# Patient Record
Sex: FEMALE | Race: BLACK OR AFRICAN AMERICAN | NOT HISPANIC OR LATINO | URBAN - METROPOLITAN AREA
[De-identification: names, ages, dates, MRNs, and addresses within clinical notes are randomized per-mention and may not be internally consistent; named-entity substitution may affect disease eponyms.]

---

## 2024-01-05 ENCOUNTER — EMERGENCY (EMERGENCY)
Facility: HOSPITAL | Age: 64
LOS: 1 days | Discharge: HOME | End: 2024-01-05
Attending: STUDENT IN AN ORGANIZED HEALTH CARE EDUCATION/TRAINING PROGRAM | Admitting: EMERGENCY MEDICINE
Payer: MEDICARE

## 2024-01-05 VITALS
DIASTOLIC BLOOD PRESSURE: 83 MMHG | SYSTOLIC BLOOD PRESSURE: 141 MMHG | OXYGEN SATURATION: 99 % | HEART RATE: 84 BPM | RESPIRATION RATE: 16 BRPM | TEMPERATURE: 99 F | HEIGHT: 66 IN | WEIGHT: 132.94 LBS

## 2024-01-05 VITALS
HEART RATE: 62 BPM | DIASTOLIC BLOOD PRESSURE: 72 MMHG | RESPIRATION RATE: 16 BRPM | SYSTOLIC BLOOD PRESSURE: 120 MMHG | TEMPERATURE: 98 F | OXYGEN SATURATION: 100 %

## 2024-01-05 LAB
A1C WITH ESTIMATED AVERAGE GLUCOSE RESULT: 5.4 % — SIGNIFICANT CHANGE UP (ref 4–5.6)
A1C WITH ESTIMATED AVERAGE GLUCOSE RESULT: 5.4 % — SIGNIFICANT CHANGE UP (ref 4–5.6)
ALBUMIN SERPL ELPH-MCNC: 4.2 G/DL — SIGNIFICANT CHANGE UP (ref 3.3–5)
ALBUMIN SERPL ELPH-MCNC: 4.2 G/DL — SIGNIFICANT CHANGE UP (ref 3.3–5)
ALP SERPL-CCNC: 64 U/L — SIGNIFICANT CHANGE UP (ref 40–120)
ALP SERPL-CCNC: 64 U/L — SIGNIFICANT CHANGE UP (ref 40–120)
ALT FLD-CCNC: 39 U/L — HIGH (ref 4–33)
ALT FLD-CCNC: 39 U/L — HIGH (ref 4–33)
AMPHET UR-MCNC: NEGATIVE — SIGNIFICANT CHANGE UP
AMPHET UR-MCNC: NEGATIVE — SIGNIFICANT CHANGE UP
ANION GAP SERPL CALC-SCNC: 12 MMOL/L — SIGNIFICANT CHANGE UP (ref 7–14)
ANION GAP SERPL CALC-SCNC: 12 MMOL/L — SIGNIFICANT CHANGE UP (ref 7–14)
APPEARANCE UR: CLEAR — SIGNIFICANT CHANGE UP
APPEARANCE UR: CLEAR — SIGNIFICANT CHANGE UP
APTT BLD: 32.6 SEC — SIGNIFICANT CHANGE UP (ref 24.5–35.6)
APTT BLD: 32.6 SEC — SIGNIFICANT CHANGE UP (ref 24.5–35.6)
AST SERPL-CCNC: 45 U/L — HIGH (ref 4–32)
AST SERPL-CCNC: 45 U/L — HIGH (ref 4–32)
BARBITURATES UR SCN-MCNC: NEGATIVE — SIGNIFICANT CHANGE UP
BARBITURATES UR SCN-MCNC: NEGATIVE — SIGNIFICANT CHANGE UP
BASOPHILS # BLD AUTO: 0.03 K/UL — SIGNIFICANT CHANGE UP (ref 0–0.2)
BASOPHILS # BLD AUTO: 0.03 K/UL — SIGNIFICANT CHANGE UP (ref 0–0.2)
BASOPHILS NFR BLD AUTO: 0.5 % — SIGNIFICANT CHANGE UP (ref 0–2)
BASOPHILS NFR BLD AUTO: 0.5 % — SIGNIFICANT CHANGE UP (ref 0–2)
BENZODIAZ UR-MCNC: NEGATIVE — SIGNIFICANT CHANGE UP
BENZODIAZ UR-MCNC: NEGATIVE — SIGNIFICANT CHANGE UP
BILIRUB SERPL-MCNC: 0.2 MG/DL — SIGNIFICANT CHANGE UP (ref 0.2–1.2)
BILIRUB SERPL-MCNC: 0.2 MG/DL — SIGNIFICANT CHANGE UP (ref 0.2–1.2)
BILIRUB UR-MCNC: NEGATIVE — SIGNIFICANT CHANGE UP
BILIRUB UR-MCNC: NEGATIVE — SIGNIFICANT CHANGE UP
BUN SERPL-MCNC: 18 MG/DL — SIGNIFICANT CHANGE UP (ref 7–23)
BUN SERPL-MCNC: 18 MG/DL — SIGNIFICANT CHANGE UP (ref 7–23)
CALCIUM SERPL-MCNC: 9.8 MG/DL — SIGNIFICANT CHANGE UP (ref 8.4–10.5)
CALCIUM SERPL-MCNC: 9.8 MG/DL — SIGNIFICANT CHANGE UP (ref 8.4–10.5)
CHLORIDE SERPL-SCNC: 102 MMOL/L — SIGNIFICANT CHANGE UP (ref 98–107)
CHLORIDE SERPL-SCNC: 102 MMOL/L — SIGNIFICANT CHANGE UP (ref 98–107)
CK MB BLD-MCNC: 1.2 % — SIGNIFICANT CHANGE UP (ref 0–2.5)
CK MB BLD-MCNC: 1.2 % — SIGNIFICANT CHANGE UP (ref 0–2.5)
CK MB CFR SERPL CALC: 2.1 NG/ML — SIGNIFICANT CHANGE UP
CK MB CFR SERPL CALC: 2.1 NG/ML — SIGNIFICANT CHANGE UP
CK SERPL-CCNC: 179 U/L — HIGH (ref 25–170)
CK SERPL-CCNC: 179 U/L — HIGH (ref 25–170)
CO2 SERPL-SCNC: 27 MMOL/L — SIGNIFICANT CHANGE UP (ref 22–31)
CO2 SERPL-SCNC: 27 MMOL/L — SIGNIFICANT CHANGE UP (ref 22–31)
COCAINE METAB.OTHER UR-MCNC: NEGATIVE — SIGNIFICANT CHANGE UP
COCAINE METAB.OTHER UR-MCNC: NEGATIVE — SIGNIFICANT CHANGE UP
COLOR SPEC: YELLOW — SIGNIFICANT CHANGE UP
COLOR SPEC: YELLOW — SIGNIFICANT CHANGE UP
CREAT SERPL-MCNC: 0.57 MG/DL — SIGNIFICANT CHANGE UP (ref 0.5–1.3)
CREAT SERPL-MCNC: 0.57 MG/DL — SIGNIFICANT CHANGE UP (ref 0.5–1.3)
CREATININE URINE RESULT, DAU: 18 MG/DL — SIGNIFICANT CHANGE UP
CREATININE URINE RESULT, DAU: 18 MG/DL — SIGNIFICANT CHANGE UP
DIFF PNL FLD: NEGATIVE — SIGNIFICANT CHANGE UP
DIFF PNL FLD: NEGATIVE — SIGNIFICANT CHANGE UP
EGFR: 101 ML/MIN/1.73M2 — SIGNIFICANT CHANGE UP
EGFR: 101 ML/MIN/1.73M2 — SIGNIFICANT CHANGE UP
EOSINOPHIL # BLD AUTO: 0.09 K/UL — SIGNIFICANT CHANGE UP (ref 0–0.5)
EOSINOPHIL # BLD AUTO: 0.09 K/UL — SIGNIFICANT CHANGE UP (ref 0–0.5)
EOSINOPHIL NFR BLD AUTO: 1.4 % — SIGNIFICANT CHANGE UP (ref 0–6)
EOSINOPHIL NFR BLD AUTO: 1.4 % — SIGNIFICANT CHANGE UP (ref 0–6)
ESTIMATED AVERAGE GLUCOSE: 108 — SIGNIFICANT CHANGE UP
ESTIMATED AVERAGE GLUCOSE: 108 — SIGNIFICANT CHANGE UP
FOLATE SERPL-MCNC: 5.8 NG/ML — SIGNIFICANT CHANGE UP (ref 3.1–17.5)
FOLATE SERPL-MCNC: 5.8 NG/ML — SIGNIFICANT CHANGE UP (ref 3.1–17.5)
GLUCOSE SERPL-MCNC: 121 MG/DL — HIGH (ref 70–99)
GLUCOSE SERPL-MCNC: 121 MG/DL — HIGH (ref 70–99)
GLUCOSE UR QL: NEGATIVE MG/DL — SIGNIFICANT CHANGE UP
GLUCOSE UR QL: NEGATIVE MG/DL — SIGNIFICANT CHANGE UP
HCT VFR BLD CALC: 37.2 % — SIGNIFICANT CHANGE UP (ref 34.5–45)
HCT VFR BLD CALC: 37.2 % — SIGNIFICANT CHANGE UP (ref 34.5–45)
HGB BLD-MCNC: 12.4 G/DL — SIGNIFICANT CHANGE UP (ref 11.5–15.5)
HGB BLD-MCNC: 12.4 G/DL — SIGNIFICANT CHANGE UP (ref 11.5–15.5)
IANC: 1.6 K/UL — LOW (ref 1.8–7.4)
IANC: 1.6 K/UL — LOW (ref 1.8–7.4)
IMM GRANULOCYTES NFR BLD AUTO: 0.2 % — SIGNIFICANT CHANGE UP (ref 0–0.9)
IMM GRANULOCYTES NFR BLD AUTO: 0.2 % — SIGNIFICANT CHANGE UP (ref 0–0.9)
INR BLD: 1.04 RATIO — SIGNIFICANT CHANGE UP (ref 0.85–1.18)
INR BLD: 1.04 RATIO — SIGNIFICANT CHANGE UP (ref 0.85–1.18)
KETONES UR-MCNC: NEGATIVE MG/DL — SIGNIFICANT CHANGE UP
KETONES UR-MCNC: NEGATIVE MG/DL — SIGNIFICANT CHANGE UP
LACTATE SERPL-SCNC: 1.2 MMOL/L — SIGNIFICANT CHANGE UP (ref 0.5–2)
LACTATE SERPL-SCNC: 1.2 MMOL/L — SIGNIFICANT CHANGE UP (ref 0.5–2)
LEUKOCYTE ESTERASE UR-ACNC: NEGATIVE — SIGNIFICANT CHANGE UP
LEUKOCYTE ESTERASE UR-ACNC: NEGATIVE — SIGNIFICANT CHANGE UP
LYMPHOCYTES # BLD AUTO: 4.27 K/UL — HIGH (ref 1–3.3)
LYMPHOCYTES # BLD AUTO: 4.27 K/UL — HIGH (ref 1–3.3)
LYMPHOCYTES # BLD AUTO: 65.1 % — HIGH (ref 13–44)
LYMPHOCYTES # BLD AUTO: 65.1 % — HIGH (ref 13–44)
MAGNESIUM SERPL-MCNC: 1.9 MG/DL — SIGNIFICANT CHANGE UP (ref 1.6–2.6)
MCHC RBC-ENTMCNC: 30 PG — SIGNIFICANT CHANGE UP (ref 27–34)
MCHC RBC-ENTMCNC: 30 PG — SIGNIFICANT CHANGE UP (ref 27–34)
MCHC RBC-ENTMCNC: 33.3 GM/DL — SIGNIFICANT CHANGE UP (ref 32–36)
MCHC RBC-ENTMCNC: 33.3 GM/DL — SIGNIFICANT CHANGE UP (ref 32–36)
MCV RBC AUTO: 89.9 FL — SIGNIFICANT CHANGE UP (ref 80–100)
MCV RBC AUTO: 89.9 FL — SIGNIFICANT CHANGE UP (ref 80–100)
METHADONE UR-MCNC: NEGATIVE — SIGNIFICANT CHANGE UP
METHADONE UR-MCNC: NEGATIVE — SIGNIFICANT CHANGE UP
MONOCYTES # BLD AUTO: 0.56 K/UL — SIGNIFICANT CHANGE UP (ref 0–0.9)
MONOCYTES # BLD AUTO: 0.56 K/UL — SIGNIFICANT CHANGE UP (ref 0–0.9)
MONOCYTES NFR BLD AUTO: 8.5 % — SIGNIFICANT CHANGE UP (ref 2–14)
MONOCYTES NFR BLD AUTO: 8.5 % — SIGNIFICANT CHANGE UP (ref 2–14)
NEUTROPHILS # BLD AUTO: 1.6 K/UL — LOW (ref 1.8–7.4)
NEUTROPHILS # BLD AUTO: 1.6 K/UL — LOW (ref 1.8–7.4)
NEUTROPHILS NFR BLD AUTO: 24.3 % — LOW (ref 43–77)
NEUTROPHILS NFR BLD AUTO: 24.3 % — LOW (ref 43–77)
NITRITE UR-MCNC: NEGATIVE — SIGNIFICANT CHANGE UP
NITRITE UR-MCNC: NEGATIVE — SIGNIFICANT CHANGE UP
NRBC # BLD: 0 /100 WBCS — SIGNIFICANT CHANGE UP (ref 0–0)
NRBC # BLD: 0 /100 WBCS — SIGNIFICANT CHANGE UP (ref 0–0)
NRBC # FLD: 0 K/UL — SIGNIFICANT CHANGE UP (ref 0–0)
NRBC # FLD: 0 K/UL — SIGNIFICANT CHANGE UP (ref 0–0)
OPIATES UR-MCNC: NEGATIVE — SIGNIFICANT CHANGE UP
OPIATES UR-MCNC: NEGATIVE — SIGNIFICANT CHANGE UP
OXYCODONE UR-MCNC: NEGATIVE — SIGNIFICANT CHANGE UP
OXYCODONE UR-MCNC: NEGATIVE — SIGNIFICANT CHANGE UP
PCP SPEC-MCNC: SIGNIFICANT CHANGE UP
PCP SPEC-MCNC: SIGNIFICANT CHANGE UP
PCP UR-MCNC: NEGATIVE — SIGNIFICANT CHANGE UP
PCP UR-MCNC: NEGATIVE — SIGNIFICANT CHANGE UP
PH UR: 7.5 — SIGNIFICANT CHANGE UP (ref 5–8)
PH UR: 7.5 — SIGNIFICANT CHANGE UP (ref 5–8)
PHOSPHATE SERPL-MCNC: 2.9 MG/DL — SIGNIFICANT CHANGE UP (ref 2.5–4.5)
PHOSPHATE SERPL-MCNC: 2.9 MG/DL — SIGNIFICANT CHANGE UP (ref 2.5–4.5)
PHOSPHATE SERPL-MCNC: 3.2 MG/DL — SIGNIFICANT CHANGE UP (ref 2.5–4.5)
PHOSPHATE SERPL-MCNC: 3.2 MG/DL — SIGNIFICANT CHANGE UP (ref 2.5–4.5)
PLATELET # BLD AUTO: 192 K/UL — SIGNIFICANT CHANGE UP (ref 150–400)
PLATELET # BLD AUTO: 192 K/UL — SIGNIFICANT CHANGE UP (ref 150–400)
POTASSIUM SERPL-MCNC: 3.2 MMOL/L — LOW (ref 3.5–5.3)
POTASSIUM SERPL-MCNC: 3.2 MMOL/L — LOW (ref 3.5–5.3)
POTASSIUM SERPL-SCNC: 3.2 MMOL/L — LOW (ref 3.5–5.3)
POTASSIUM SERPL-SCNC: 3.2 MMOL/L — LOW (ref 3.5–5.3)
PROT SERPL-MCNC: 7.9 G/DL — SIGNIFICANT CHANGE UP (ref 6–8.3)
PROT SERPL-MCNC: 7.9 G/DL — SIGNIFICANT CHANGE UP (ref 6–8.3)
PROT UR-MCNC: NEGATIVE MG/DL — SIGNIFICANT CHANGE UP
PROT UR-MCNC: NEGATIVE MG/DL — SIGNIFICANT CHANGE UP
PROTHROM AB SERPL-ACNC: 11.6 SEC — SIGNIFICANT CHANGE UP (ref 9.5–13)
PROTHROM AB SERPL-ACNC: 11.6 SEC — SIGNIFICANT CHANGE UP (ref 9.5–13)
RBC # BLD: 4.14 M/UL — SIGNIFICANT CHANGE UP (ref 3.8–5.2)
RBC # BLD: 4.14 M/UL — SIGNIFICANT CHANGE UP (ref 3.8–5.2)
RBC # FLD: 11.9 % — SIGNIFICANT CHANGE UP (ref 10.3–14.5)
RBC # FLD: 11.9 % — SIGNIFICANT CHANGE UP (ref 10.3–14.5)
SODIUM SERPL-SCNC: 141 MMOL/L — SIGNIFICANT CHANGE UP (ref 135–145)
SODIUM SERPL-SCNC: 141 MMOL/L — SIGNIFICANT CHANGE UP (ref 135–145)
SP GR SPEC: 1.03 — SIGNIFICANT CHANGE UP (ref 1–1.03)
SP GR SPEC: 1.03 — SIGNIFICANT CHANGE UP (ref 1–1.03)
T4 AB SER-ACNC: 6.4 UG/DL — SIGNIFICANT CHANGE UP (ref 5.1–13)
T4 AB SER-ACNC: 6.4 UG/DL — SIGNIFICANT CHANGE UP (ref 5.1–13)
THC UR QL: NEGATIVE — SIGNIFICANT CHANGE UP
THC UR QL: NEGATIVE — SIGNIFICANT CHANGE UP
TROPONIN T, HIGH SENSITIVITY RESULT: 8 NG/L — SIGNIFICANT CHANGE UP
TROPONIN T, HIGH SENSITIVITY RESULT: 8 NG/L — SIGNIFICANT CHANGE UP
TSH SERPL-MCNC: 6.53 UIU/ML — HIGH (ref 0.27–4.2)
TSH SERPL-MCNC: 6.53 UIU/ML — HIGH (ref 0.27–4.2)
UROBILINOGEN FLD QL: 0.2 MG/DL — SIGNIFICANT CHANGE UP (ref 0.2–1)
UROBILINOGEN FLD QL: 0.2 MG/DL — SIGNIFICANT CHANGE UP (ref 0.2–1)
VIT B12 SERPL-MCNC: 1798 PG/ML — HIGH (ref 200–900)
VIT B12 SERPL-MCNC: 1798 PG/ML — HIGH (ref 200–900)
WBC # BLD: 6.56 K/UL — SIGNIFICANT CHANGE UP (ref 3.8–10.5)
WBC # BLD: 6.56 K/UL — SIGNIFICANT CHANGE UP (ref 3.8–10.5)
WBC # FLD AUTO: 6.56 K/UL — SIGNIFICANT CHANGE UP (ref 3.8–10.5)
WBC # FLD AUTO: 6.56 K/UL — SIGNIFICANT CHANGE UP (ref 3.8–10.5)

## 2024-01-05 PROCEDURE — 99284 EMERGENCY DEPT VISIT MOD MDM: CPT

## 2024-01-05 PROCEDURE — 93010 ELECTROCARDIOGRAM REPORT: CPT

## 2024-01-05 PROCEDURE — 70450 CT HEAD/BRAIN W/O DYE: CPT | Mod: 26,MA,XU

## 2024-01-05 PROCEDURE — 70498 CT ANGIOGRAPHY NECK: CPT | Mod: 26,MA

## 2024-01-05 PROCEDURE — 70496 CT ANGIOGRAPHY HEAD: CPT | Mod: 26,MA

## 2024-01-05 PROCEDURE — 70553 MRI BRAIN STEM W/O & W/DYE: CPT | Mod: 26,MA

## 2024-01-05 PROCEDURE — 99236 HOSP IP/OBS SAME DATE HI 85: CPT | Mod: FS

## 2024-01-05 PROCEDURE — 0042T: CPT | Mod: MA

## 2024-01-05 RX ORDER — MESALAMINE 400 MG
750 TABLET, DELAYED RELEASE (ENTERIC COATED) ORAL ONCE
Refills: 0 | Status: COMPLETED | OUTPATIENT
Start: 2024-01-05 | End: 2024-01-05

## 2024-01-05 RX ORDER — AMLODIPINE BESYLATE 2.5 MG/1
5 TABLET ORAL DAILY
Refills: 0 | Status: DISCONTINUED | OUTPATIENT
Start: 2024-01-05 | End: 2024-01-08

## 2024-01-05 RX ORDER — POTASSIUM CHLORIDE 20 MEQ
40 PACKET (EA) ORAL EVERY 4 HOURS
Refills: 0 | Status: COMPLETED | OUTPATIENT
Start: 2024-01-05 | End: 2024-01-05

## 2024-01-05 RX ADMIN — Medication 40 MILLIEQUIVALENT(S): at 06:20

## 2024-01-05 RX ADMIN — AMLODIPINE BESYLATE 5 MILLIGRAM(S): 2.5 TABLET ORAL at 05:49

## 2024-01-05 RX ADMIN — Medication 40 MILLIEQUIVALENT(S): at 02:28

## 2024-01-05 NOTE — ED ADULT NURSE NOTE - OBJECTIVE STATEMENT
SHERRY RN: CODE STROKE called pt brought directly to CT. pt a&ox4 with past medical history of hypertension and colitis ambulatory with steady gait c/o episode of AMS. pt last known normal 00:30AM. as per  pt "[place] the city [place] and changed the curtains in the house and  then went and took a shower when got home." pt did not recall any events of the day precipitating shower. pt now able to recollect events of the day and feels back to baseline. pt with no neuro or sensory deficits. pt with normal strength and sensation x4. pt PERRLA. pt with no facial droop. pt abdomen soft and nondistended. pt denies chest pain, sob, nausea, vomiting, dizziness, headache, fevers/chills. pt brought to trauma B after CT. 18g to the Left AC. labs collected and sent. pt respirations even and unlabored with no accessory muscle use. pt normal sinus on monitor. pt appears in NAD, safety maintained and report given to primary RN Jennifer.

## 2024-01-05 NOTE — ED PROVIDER NOTE - PHYSICAL EXAMINATION
GEN: Awake, AOx3, NAD. Memory now at baseline  HEENT: NCAT  ---EYES: no scleral icterus, EOMI, PERRLA  CARDIO: RRR. Normal S1/S2, no m/r/g. No JVD.  RESP: Normal respiratory effort  ABD: Soft, NTND.   MSK: No obvious deformity or ROM deficit.   SKIN: Warm, dry.   NEURO: Moves all four extremities spontaneously  PSYCH: Appropriate mood & affect.

## 2024-01-05 NOTE — ED CDU PROVIDER DISPOSITION NOTE - ATTENDING CONTRIBUTION TO CARE
Dr. Green:  I performed a face to face bedside interview with patient regarding history of present illness, review of symptoms and past medical history. I completed an independent physical exam.  I have discussed patient's plan of care with PA.   I agree with note as stated above, having amended the EMR as needed to reflect my findings.   This includes HISTORY OF PRESENT ILLNESS, HIV, PAST MEDICAL/SURGICAL/FAMILY/SOCIAL HISTORY, ALLERGIES AND HOME MEDICATIONS, REVIEW OF SYSTEMS, PHYSICAL EXAM, and any PROGRESS NOTES during the time I functioned as the attending physician for this patient.

## 2024-01-05 NOTE — ED ADULT NURSE NOTE - NSFALLUNIVINTERV_ED_ALL_ED
Bed/Stretcher in lowest position, wheels locked, appropriate side rails in place/Call bell, personal items and telephone in reach/Instruct patient to call for assistance before getting out of bed/chair/stretcher/Non-slip footwear applied when patient is off stretcher/[place] to call system/Physically safe environment - no spills, clutter or unnecessary equipment/Purposeful proactive rounding/Room/bathroom lighting operational, light cord in reach Bed/Stretcher in lowest position, wheels locked, appropriate side rails in place/Call bell, personal items and telephone in reach/Instruct patient to call for assistance before getting out of bed/chair/stretcher/Non-slip footwear applied when patient is off stretcher/[place] to call system/Physically safe environment - no spills, clutter or unnecessary equipment/Purposeful proactive rounding/Room/bathroom lighting operational, light cord in reach

## 2024-01-05 NOTE — ED ADULT NURSE REASSESSMENT NOTE - NS ED NURSE REASSESS COMMENT FT1
A&Ox4. ambulatory. NAD. pt denies SOB, chest pain, dizziness, weakness, urinary symptoms, HA, n/v/d, fevers, chills, pain. respirations are even and un labored. safety precautions maintained. IV removed.
Report received from BYRON Lew. Pt A&Ox3, sitting up in stretcher comfortably at this time. Breathing even and unlabored on room air. Pt remains on continuous cardiac monitor, NSR noted. Pt medicated per EMAR. Pt denies any complaints at this time. No acute distress noted. Plan of care ongoing, comfort measures provided and safety measures maintained. Awaiting EKG.
report received from overnight RN. A&Ox4. ambulatory. NAD. pt denies SOB, chest pain, dizziness, weakness, urinary symptoms, HA, n/v/d, fevers, chills. respirations are even and un labored. skin intact. safety precautions maintained. call bell at bedside.
Upon reassessment pt sleeping in stretcher, arousable to verbal and tactile stimuli. Respirations even and unlabored on room air. Pt remains on continuous cardiac monitor, NSR noted. Pt medicated per EMAR.. No acute distress noted. Denies any complaints at this time. NIH as noted in flow sheet. Plan of care ongoing, comfort measures provided and safety measures maintained. Awaiting disposition.

## 2024-01-05 NOTE — ED CDU PROVIDER INITIAL DAY NOTE - ATTENDING APP SHARED VISIT CONTRIBUTION OF CARE
62 yo F hx HTN, ulcerative colitis, brought in by ambulance, s/p episode of confusion around 12:30 am. Pt last known well about half hour before that, before she got into the shower. She forgot that she went into the city with her , didn't know that people were in her house or that she had changed her curtains. During the episode, she was able to correctly show her  3 fingers when prompted to. He states she did not have slurred speech, facial droop or any focal weakness during the episode. Episode now resolved, she does not recall what happened between showering and EMS arriving. No seizure like episode, no tongue biting or urinary incontinence. No falls/trauma. No history of similar episodes in the past.     VITALS: reviewed  GEN: NAD, A & O x 4  HEAD/EYES: NCAT, EOMI, anicteric sclerae,   ENT: mucus membranes moist, oropharynx WNL, trachea midline,  RESP: lungs CTA with equal breath sounds bilaterally, chest wall nontender and atraumatic  CV: heart with reg rhythm S1, S2, distal pulses intact and symmetric bilaterally  ABDOMEN: normoactive bowel sounds, soft, nondistended, nontender, no palpable masses  : no CVAT  MSK: extremities atraumatic and nontender, no edema, no asymmetry.  SKIN: warm, dry, no rash, no bruising, no cyanosis. color appropriate for ethnicity  NEURO: alert, mentating appropriately, no facial asymmetry. Motor and sensation intact. coordination intact. NIHSS 0.   PSYCH: Affect appropriate    Likely transient global amnesia less likely CVA or seizure. Code stroke called, pt now at baseline, no deficits. Labs and CT wnl. Plan for CDU for MRI. 64 yo F hx HTN, ulcerative colitis, brought in by ambulance, s/p episode of confusion around 12:30 am. Pt last known well about half hour before that, before she got into the shower. She forgot that she went into the city with her , didn't know that people were in her house or that she had changed her curtains. During the episode, she was able to correctly show her  3 fingers when prompted to. He states she did not have slurred speech, facial droop or any focal weakness during the episode. Episode now resolved, she does not recall what happened between showering and EMS arriving. No seizure like episode, no tongue biting or urinary incontinence. No falls/trauma. No history of similar episodes in the past.     VITALS: reviewed  GEN: NAD, A & O x 4  HEAD/EYES: NCAT, EOMI, anicteric sclerae,   ENT: mucus membranes moist, oropharynx WNL, trachea midline,  RESP: lungs CTA with equal breath sounds bilaterally, chest wall nontender and atraumatic  CV: heart with reg rhythm S1, S2, distal pulses intact and symmetric bilaterally  ABDOMEN: normoactive bowel sounds, soft, nondistended, nontender, no palpable masses  : no CVAT  MSK: extremities atraumatic and nontender, no edema, no asymmetry.  SKIN: warm, dry, no rash, no bruising, no cyanosis. color appropriate for ethnicity  NEURO: alert, mentating appropriately, no facial asymmetry. Motor and sensation intact. coordination intact. NIHSS 0.   PSYCH: Affect appropriate    Likely transient global amnesia less likely CVA or seizure. Code stroke called, pt now at baseline, no deficits. Labs and CT wnl. Plan for CDU for MRI.

## 2024-01-05 NOTE — CONSULT NOTE ADULT - ASSESSMENT
IMPRESSION:    RECOMMENDATIONS:  63F PMHx HTN and ulcerative colitis (on Mesalamine) presenting with memory lapse. Pt could not remember the details of the day when pt came out of the shower that took around 10 minutes or so. Pt denies falling in the bathroom or syncopal episode. No tongue biting or urinary incontinence. No new medications or drug use. Vitals stable. CBC and CMP WNL. . CTH w/o acute findings. CTA and CTP w/o flow-limiting stenosis. Pt not a candidate for tenecteplase due to non-disabling symptoms and not a candidate for thrombectomy due to no LVO seen on CTA H/N.    LKN 0030 1/5/24  NIHSS 0.  mRS 0    IMPRESSION: Inability to recall prior day's events in the setting of normal neurological exam likely 2/2 transient global amnesia vs. less likely seizure-like activity.     RECOMMENDATIONS:   [] Admit to CDU  [] MRI brain w/ and w/o contrast  [] Check UA, Utox, CXR, BCx, CBC, CMP, Coag, Mg, Phos, TSH w/ reflex T4, B12, Folate, CK, Lactate  [] Rest of care per primary team    Case to be seen and d/w Neurology attending.

## 2024-01-05 NOTE — ED ADULT NURSE NOTE - CHIEF COMPLAINT QUOTE
Pt arrives to ED with report of AMS from spouse.   Pt went to see [facility] today and does not recall doing that.  Also pt did not recall putting up blinds on windows for [place].  LKN 00:30.  Pt otherwise A&Ox3 Only past Hx of HTN.  fs = 108 by EMS. Pt arrives to ED with report of AMS from spouse.   Pt went to see [facility] today and does not recall doing that.  Also pt did not recall putting up blinds on windows for [place].  LKN 00:30.  Pt otherwise A&Ox3 Only past Hx of HTN.  fs = 108 by EMS.

## 2024-01-05 NOTE — ED CDU PROVIDER DISPOSITION NOTE - NSFOLLOWUPINSTRUCTIONS_ED_ALL_ED_FT
Follow up with your Primary Medical Doctor in 1-2 days.  Follow up with Neurology in 1-2 days call to schedule an appointment [phone].  Rest.  Stay well hydrated.  Return to the ER for any persistent/worsening or new symptoms weakness, numbness, tingling or any concerning symptoms. Follow up with your Primary Medical Doctor in 1-2 days.  Follow up with Neurology in 1-2 days call to schedule an appointment [phone].  Rest.  Stay well hydrated.  Return to the ER for any persistent/worsening or new symptoms weakness, numbness, tingling or any concerning symptoms.

## 2024-01-05 NOTE — ED CDU PROVIDER INITIAL DAY NOTE - CLINICAL SUMMARY MEDICAL DECISION MAKING FREE TEXT BOX
Pt is a 64 YO F with PMH UC (on Mesalamine), HTN who presented to ED after episode of amnesia this evening. Pt reports she got out of the shower this evening and could not remember events from earlier in the day. Pt was a code stroke on arrival to ED. Pt without acute abnormality on blood work, CT brain unremarkable. Pt was seen by neurology. Pt placed in CDU for MRI imaging, neuro checks and reassessment. Pt is a 62 YO F with PMH UC (on Mesalamine), HTN who presented to ED after episode of amnesia this evening. Pt reports she got out of the shower this evening and could not remember events from earlier in the day. Pt was a code stroke on arrival to ED. Pt without acute abnormality on blood work, CT brain unremarkable. Pt was seen by neurology. Pt placed in CDU for MRI imaging, neuro checks and reassessment.

## 2024-01-05 NOTE — ED ADULT NURSE NOTE - NS ED NURSE PATIENT LEFT UNIT TIME
07:59 V-Y Flap Text: The defect edges were debeveled with a #15 scalpel blade.  Given the location of the defect, shape of the defect and the proximity to free margins a V-Y flap was deemed most appropriate.  Using a sterile surgical marker, an appropriate advancement flap was drawn incorporating the defect and placing the expected incisions within the relaxed skin tension lines where possible.    The area thus outlined was incised deep to adipose tissue with a #15 scalpel blade.  The skin margins were undermined to an appropriate distance in all directions utilizing iris scissors.

## 2024-01-05 NOTE — ED CDU PROVIDER DISPOSITION NOTE - CLINICAL COURSE
BLANCHE Tineo: 53-year-old female with history of hypertension presenting to the emergency department complaining of an memory patient did not recall events of the day.  Patient seen evaluated and by neurology patient placed in CDU for MRI.  Patient symptoms resolved patient alert and oriented x 4. Patient seen and cleared by neurology attending Dr. Sim advised discharge home follow-up outpatient.  Patient feels well, ambulating without difficulty, discharge and results reviewed with patient. BLANCHE Tineo: 53-year-old female with history of hypertension presenting to the emergency department complaining of sudden loss of memory patient did not recall events of the day.  Patient seen evaluated and by neurology patient placed in CDU for MRI.  Patient symptoms resolved patient alert and oriented x 4. Patient seen and cleared by neurology attending Dr. Sim advised discharge home follow-up outpatient.  Patient feels well, ambulating without difficulty, discharge and results reviewed with patient.

## 2024-01-05 NOTE — ED CDU PROVIDER INITIAL DAY NOTE - CONSULTANT NAME
- some urinary retention earlier now improved off scans but still watch for re-occurrence   - proactive toileting  - monitor for s/s of infection, retention neurology

## 2024-01-05 NOTE — ED ADULT TRIAGE NOTE - CHIEF COMPLAINT QUOTE
Pt arrives to ED with report of AMS from spouse.   Pt went to see [facility] today and does not recall doing that.  Also pt did not recall putting up blinds on windows for [place].  LKN 00:30.  Pt otherwise A&Ox3 Only past Hx of HTN.  fs = 108 by EMS. Pt arrives to ED with report of AMS from spouse.   Pt went to see [facility] today and does not recall doing that.  Also pt did not recall putting up blinds on windows for [place].  LKN 00:30.  Pt otherwise A&Ox3 Only past Hx of HTN.  fs = 108 by EMS.

## 2024-01-05 NOTE — ED PROVIDER NOTE - ATTENDING CONTRIBUTION TO CARE
62 yo F hx HTN, ulcerative colitis, brought in by ambulance, s/p episode of confusion around 12:30 am. Pt last known well about half hour before that, before she got into the shower. She forgot that she went into the city with her , didn't know that people were in her house or that she had changed her curtains. During the episode, she was able to correctly show her  3 fingers when prompted to. He states she did not have slurred speech, facial droop or any focal weakness during the episode. Episode now resolved, she does not recall what happened between showering and EMS arriving. No seizure like episode, no tongue biting or urinary incontinence. No falls/trauma. No history of similar episodes in the past.     VITALS: reviewed  GEN: NAD, A & O x 4  HEAD/EYES: NCAT, EOMI, anicteric sclerae,   ENT: mucus membranes moist, oropharynx WNL, trachea midline,  RESP: lungs CTA with equal breath sounds bilaterally, chest wall nontender and atraumatic  CV: heart with reg rhythm S1, S2, distal pulses intact and symmetric bilaterally  ABDOMEN: normoactive bowel sounds, soft, nondistended, nontender, no palpable masses  : no CVAT  MSK: extremities atraumatic and nontender, no edema, no asymmetry.  SKIN: warm, dry, no rash, no bruising, no cyanosis. color appropriate for ethnicity  NEURO: alert, mentating appropriately, no facial asymmetry. Motor and sensation intact. coordination intact. NIHSS 0.   PSYCH: Affect appropriate    Likely transient global amnesia less likely CVA or seizure. Code stroke called, pt now at baseline, no deficits. Labs and CT wnl. Plan for CDU for MRI 64 yo F hx HTN, ulcerative colitis, brought in by ambulance, s/p episode of confusion around 12:30 am. Pt last known well about half hour before that, before she got into the shower. She forgot that she went into the city with her , didn't know that people were in her house or that she had changed her curtains. During the episode, she was able to correctly show her  3 fingers when prompted to. He states she did not have slurred speech, facial droop or any focal weakness during the episode. Episode now resolved, she does not recall what happened between showering and EMS arriving. No seizure like episode, no tongue biting or urinary incontinence. No falls/trauma. No history of similar episodes in the past.     VITALS: reviewed  GEN: NAD, A & O x 4  HEAD/EYES: NCAT, EOMI, anicteric sclerae,   ENT: mucus membranes moist, oropharynx WNL, trachea midline,  RESP: lungs CTA with equal breath sounds bilaterally, chest wall nontender and atraumatic  CV: heart with reg rhythm S1, S2, distal pulses intact and symmetric bilaterally  ABDOMEN: normoactive bowel sounds, soft, nondistended, nontender, no palpable masses  : no CVAT  MSK: extremities atraumatic and nontender, no edema, no asymmetry.  SKIN: warm, dry, no rash, no bruising, no cyanosis. color appropriate for ethnicity  NEURO: alert, mentating appropriately, no facial asymmetry. Motor and sensation intact. coordination intact. NIHSS 0.   PSYCH: Affect appropriate    Likely transient global amnesia less likely CVA or seizure. Code stroke called, pt now at baseline, no deficits. Labs and CT wnl. Plan for CDU for MRI

## 2024-01-05 NOTE — CONSULT NOTE ADULT - SUBJECTIVE AND OBJECTIVE BOX
Neurology - Consult Note    Spectra: [phone] ([facility]), [phone] ([facility])    HPI:  63F PMHx HTN presenting with memory lapse and confusion. Per  at bedside, pt went into the shower around 12:30AM and when she was done, pt stated that she does not remember anything that happened today.  states she was able to identify , son's names and where they were but pt could not remember the details of the day such as going to [email or web address] with her . Pt denies any similar presentation. No new medications recently. Code stroke activated in ED. LKN 0030 1/6/24. NIHSS 0. mRS 0.     Review of Systems:  CONSTITUTIONAL: No fevers or chills  EYES AND ENT: No visual changes or no throat pain   NECK: No pain or stiffness  RESPIRATORY: No shortness of breath  CARDIOVASCULAR: No chest pain or palpitations  GASTROINTESTINAL: No nausea or vomiting   GENITOURINARY: No dysuria  NEUROLOGICAL: +As stated in HPI above  SKIN: No itching, burning, rashes, or lesions   All other review of systems is negative unless indicated above.    Allergies:  No Known Allergies      PMHx/PSHx/Family Hx: As above, otherwise see below   HTN (hypertension)      Social Hx:  Never smoker; no current use of tobacco, alcohol, or illicit drugs  Lives with  at home. Fully independent at baseline.     Medications:  MEDICATIONS  (STANDING):    MEDICATIONS  (PRN):      Vitals:  T(C): 37 (01-05-24 @ 01:13), Max: 37 (01-05-24 @ 01:13)  HR: 84 (01-05-24 @ 01:13) (84 - 84)  BP: 141/83 (01-05-24 @ 01:13) (141/83 - 141/83)  RR: 16 (01-05-24 @ 01:13) (16 - 16)  SpO2: 99% (01-05-24 @ 01:13) (99% - 99%)    Physical Examination: INCOMPLETE  General - non-toxic appearing male/female in no acute distress  Cardiovascular - peripheral pulses palpable, no edema  Respiratory - breathing comfortably with no increased work of breathing    Neurologic Exam:  Mental status - Awake, Alert, Oriented to person, place, and time. Speech fluent, repetition and naming intact. Follows simple and complex commands. Attention/concentration, recent and remote memory (including registration 3/3 and recall 3/3), and fund of knowledge intact    Cranial nerves - PERRLA (4mm -> 3mm b/l), VFF, EOMI, face sensation (V1-V3) intact b/l, facial strength intact without asymmetry b/l, hearing intact b/l, palate with symmetric elevation, trapezius OR sternocleidomastiod 5/5 strength b/l, tongue midline on protrusion with full lateral movement    Motor - Normal bulk and tone throughout. No pronator drift.    Strength testing            Deltoid      Biceps      Triceps     Wrist Extension    Wrist Flexion     Interossei         R            5                 5               5                     5                              5                        5                 5  L             5                 5               5                     5                              5                        5                 5              Hip Flexion    Hip Extension    Knee Flexion    Knee Extension    Dorsiflexion    Plantar Flexion  R              5                         5                       5                           5                            5                          5  L              5                         5                        5                           5                            5                          5    Sensation - Light touch/temperature OR pain/vibration intact throughout    DTR's -             Biceps      Triceps     Brachioradialis      Patellar    Ankle    Toes/plantar response  R             2+             2+                  2+                       2+            2+                 Down  L              2+             2+                 2+                        2+           2+                 Down    Coordination - Finger to Nose intact b/l. No tremors appreciated    Gait and station - Normal casual gait. Romberg (-)    Labs:          CAPILLARY BLOOD GLUCOSE              CSF:                  Radiology:     Neurology - Consult Note    Spectra: [phone] ([facility]), [phone] ([facility])    HPI:  63F PMHx HTN presenting with memory lapse and confusion. Per  at bedside, pt went into the shower around 12:30AM and when she was done, pt stated that she does not remember anything that happened today.  states she was able to identify , son's names and where they were but pt could not remember the details of the day such as going to [employer] with her . Pt denies any similar presentation. No new medications recently. Code stroke activated in ED. LKN 0030 1/6/24. NIHSS 0. mRS 0.     Review of Systems:  CONSTITUTIONAL: No fevers or chills  EYES AND ENT: No visual changes or no throat pain   NECK: No pain or stiffness  RESPIRATORY: No shortness of breath  CARDIOVASCULAR: No chest pain or palpitations  GASTROINTESTINAL: No nausea or vomiting   GENITOURINARY: No dysuria  NEUROLOGICAL: +As stated in HPI above  SKIN: No itching, burning, rashes, or lesions   All other review of systems is negative unless indicated above.    Allergies:  No Known Allergies      PMHx/PSHx/Family Hx: As above, otherwise see below   HTN (hypertension)      Social Hx:  Never smoker; no current use of tobacco, alcohol, or illicit drugs  Lives with  at home. Fully independent at baseline.     Medications:  MEDICATIONS  (STANDING):    MEDICATIONS  (PRN):      Vitals:  T(C): 37 (01-05-24 @ 01:13), Max: 37 (01-05-24 @ 01:13)  HR: 84 (01-05-24 @ 01:13) (84 - 84)  BP: 141/83 (01-05-24 @ 01:13) (141/83 - 141/83)  RR: 16 (01-05-24 @ 01:13) (16 - 16)  SpO2: 99% (01-05-24 @ 01:13) (99% - 99%)    Physical Examination: INCOMPLETE  General - non-toxic appearing male/female in no acute distress  Cardiovascular - peripheral pulses palpable, no edema  Respiratory - breathing comfortably with no increased work of breathing    Neurologic Exam:  Mental status - Awake, Alert, Oriented to person, place, and time. Speech fluent, repetition and naming intact. Follows simple and complex commands. Attention/concentration, recent and remote memory (including registration 3/3 and recall 3/3), and fund of knowledge intact    Cranial nerves - PERRLA (4mm -> 3mm b/l), VFF, EOMI, face sensation (V1-V3) intact b/l, facial strength intact without asymmetry b/l, hearing intact b/l, palate with symmetric elevation, trapezius OR sternocleidomastiod 5/5 strength b/l, tongue midline on protrusion with full lateral movement    Motor - Normal bulk and tone throughout. No pronator drift.    Strength testing            Deltoid      Biceps      Triceps     Wrist Extension    Wrist Flexion     Interossei         R            5                 5               5                     5                              5                        5                 5  L             5                 5               5                     5                              5                        5                 5              Hip Flexion    Hip Extension    Knee Flexion    Knee Extension    Dorsiflexion    Plantar Flexion  R              5                         5                       5                           5                            5                          5  L              5                         5                        5                           5                            5                          5    Sensation - Light touch/temperature OR pain/vibration intact throughout    DTR's -             Biceps      Triceps     Brachioradialis      Patellar    Ankle    Toes/plantar response  R             2+             2+                  2+                       2+            2+                 Down  L              2+             2+                 2+                        2+           2+                 Down    Coordination - Finger to Nose intact b/l. No tremors appreciated    Gait and station - Normal casual gait. Romberg (-)    Labs:          CAPILLARY BLOOD GLUCOSE              CSF:                  Radiology:     Neurology - Consult Note    Spectra: [phone] ([facility]), [phone] ([facility])    HPI:  63F PMHx HTN and ulcerative colitis (on Mesalamine) presenting with memory lapse. Per  at bedside, pt went into the shower around 12:30AM and when she was done, pt stated that she does not remember anything that happened today, 1/4/24.  states she was able to identify , son's names and where they were but pt could not remember the details of the day such as going to [employer] with her  and speaking with her daughter. Pt denies any similar presentation in the past. Denies fever, chills, head trauma, numbness/tingling, weakness, seizure-like activity, urinary incontinence, tongue biting. Code stroke activated in ED. LKN 0030 1/6/24. NIHSS 0. mRS 0.     Review of Systems:  CONSTITUTIONAL: No fevers or chills  EYES AND ENT: No visual changes or no throat pain   NECK: No pain or stiffness  RESPIRATORY: No shortness of breath  CARDIOVASCULAR: No chest pain or palpitations  GASTROINTESTINAL: No nausea or vomiting   GENITOURINARY: No dysuria  NEUROLOGICAL: +As stated in HPI above  SKIN: No itching, burning, rashes, or lesions   All other review of systems is negative unless indicated above.    Allergies:  No Known Allergies    PMHx/PSHx/Family Hx: As above, otherwise see below   HTN (hypertension)    Social Hx:  Never smoker; no current use of tobacco, alcohol, or illicit drugs  Lives with  at home. Fully independent at baseline.     Medications:  MEDICATIONS  (STANDING):    MEDICATIONS  (PRN):      Vitals:  T(C): 37 (01-05-24 @ 01:13), Max: 37 (01-05-24 @ 01:13)  HR: 84 (01-05-24 @ 01:13) (84 - 84)  BP: 141/83 (01-05-24 @ 01:13) (141/83 - 141/83)  RR: 16 (01-05-24 @ 01:13) (16 - 16)  SpO2: 99% (01-05-24 @ 01:13) (99% - 99%)    Physical Examination:  General - non-toxic appearing female in no acute distress  Cardiovascular - peripheral pulses palpable, no edema  Respiratory - breathing comfortably with no increased work of breathing    Neurologic Exam:  Mental status - Awake, Alert, Oriented to person, place, and time. Speech fluent, repetition and naming intact. Follows simple and complex commands. Attention/concentration, recent and remote memory (including registration 3/3 and recall 3/3), and fund of knowledge intact    Cranial nerves - PERRL (4mm -> 3mm b/l), VFF, EOMI, face sensation (V1-V3) intact b/l, facial strength intact without asymmetry b/l, hearing intact b/l, palate with symmetric elevation, trapezius 5/5 strength b/l, tongue midline on protrusion with full lateral movement    Motor - Normal bulk and tone throughout. No pronator drift.    Strength testing            Deltoid      Biceps      Triceps     Wrist Extension    Wrist Flexion     Interossei         R            5              5               5              5                         5                     5                 5  L             5              5               5              5                         5                     5                 5              Hip Flexion    Hip Extension    Knee Flexion    Knee Extension    Dorsiflexion    Plantar Flexion  R              5                  5                       5                     5                        5                          5  L              5                  5                        5                     5                        5                          5    Sensation - Light touch intact throughout    DTR's - pectoralis and suprapatellar reflexes intact              Biceps      Triceps     Brachioradialis      Patellar    Ankle    Toes/plantar response  R            3+          2+             2+                       3+        1+                 Down  L             3+          2+            2+                        3+        1+                 Down    Coordination - Finger to Nose intact b/l. Heel to kaye intact b/l. No tremors appreciated    Gait and station - Normal casual gait.    Labs:                        12.4   6.56  )-----------( 192      ( 05 Jan 2024 01:37 )             37.2     01-05    141  |  102  |  18  ----------------------------<  121<H>  3.2<L>   |  27  |  0.57    Ca    9.8      05 Jan 2024 01:37  Phos  2.9     01-05  Mg     1.90     01-05    TPro  7.9  /  Alb  4.2  /  TBili  0.2  /  DBili  x   /  AST  45<H>  /  ALT  39<H>  /  AlkPhos  64  01-05    PT/INR - ( 05 Jan 2024 01:37 )   PT: 11.6 sec;   INR: 1.04 ratio       PTT - ( 05 Jan 2024 01:37 )  PTT:32.6 sec  CARDIAC MARKERS ( 05 Jan 2024 01:37 )  x     / x     / 179 U/L / x     / 2.1 ng/mL    Urinalysis Basic - ( 05 Jan 2024 01:37 )  Color: x / Appearance: x / SG: x / pH: x  Gluc: 121 mg/dL / Ketone: x  / Bili: x / Urobili: x   Blood: x / Protein: x / Nitrite: x   Leuk Esterase: x / RBC: x / WBC x   Sq Epi: x / Non Sq Epi: x / Bacteria: x      Radiology:  < from: CT Brain Stroke Protocol (01.05.24 @ 01:43) >  FINDINGS:  There is no CT evidence of acute intracranial hemorrhage, mass effect or   midline shift.  Gray matter-white matterdifferentiation is grossly   preserved.    The ventricles and sulci appear within limits.    The visualized paranasal sinuses and mastoids are clear bilaterally.    The calvarium, skull base, and visualized portions of the orbits appear   within normal limits.    IMPRESSION:  No CT evidence of intracranial pathology.    < end of copied text >     Neurology - Consult Note    Spectra: [phone] ([facility]), [phone] ([facility])    HPI:  63F PMHx HTN and ulcerative colitis (on Mesalamine) presenting with memory lapse. Per  at bedside, pt went into the shower around 12:30AM and when she was done, pt stated that she does not remember anything that happened today, 1/4/24.  states she was able to identify , son's names and where they were but pt could not remember the details of the day such as going to [employer] with her  and speaking with her daughter. Pt denies any similar presentation in the past. Denies fever, chills, head trauma, numbness/tingling, weakness, seizure-like activity, urinary incontinence, tongue biting. Code stroke activated in ED. LKN 0030 1/6/24. NIHSS 0. mRS 0.     Review of Systems:  CONSTITUTIONAL: No fevers or chills  EYES AND ENT: No visual changes or no throat pain   NECK: No pain or stiffness  RESPIRATORY: No shortness of breath  CARDIOVASCULAR: No chest pain or palpitations  GASTROINTESTINAL: No nausea or vomiting   GENITOURINARY: No dysuria  NEUROLOGICAL: +As stated in HPI above  SKIN: No itching, burning, rashes, or lesions   All other review of systems is negative unless indicated above.    Allergies:  No Known Allergies    PMHx/PSHx/Family Hx: As above, otherwise see below   HTN (hypertension)    Social Hx:  Never smoker; no current use of tobacco, alcohol, or illicit drugs  Lives with  at home. Fully independent at baseline.     Medications:  MEDICATIONS  (STANDING):    MEDICATIONS  (PRN):      Vitals:  T(C): 37 (01-05-24 @ 01:13), Max: 37 (01-05-24 @ 01:13)  HR: 84 (01-05-24 @ 01:13) (84 - 84)  BP: 141/83 (01-05-24 @ 01:13) (141/83 - 141/83)  RR: 16 (01-05-24 @ 01:13) (16 - 16)  SpO2: 99% (01-05-24 @ 01:13) (99% - 99%)    Physical Examination:  General - non-toxic appearing female in no acute distress  Cardiovascular - peripheral pulses palpable, no edema  Respiratory - breathing comfortably with no increased work of breathing    Neurologic Exam:  Mental status - Awake, Alert, Oriented to person, place, and time. Speech fluent, repetition and naming intact. Follows simple and complex commands. Attention/concentration, recent and remote memory (including registration 3/3 and recall 3/3), and fund of knowledge intact    Cranial nerves - PERRL (4mm -> 3mm b/l), VFF, EOMI, face sensation (V1-V3) intact b/l, facial strength intact without asymmetry b/l, hearing intact b/l, palate with symmetric elevation, trapezius 5/5 strength b/l, tongue midline on protrusion with full lateral movement    Motor - Normal bulk and tone throughout. No pronator drift.    Strength testing            Deltoid      Biceps      Triceps     Wrist Extension    Wrist Flexion     Interossei         R            5              5               5              5                         5                     5                 5  L             5              5               5              5                         5                     5                 5              Hip Flexion    Hip Extension    Knee Flexion    Knee Extension    Dorsiflexion    Plantar Flexion  R              5                  5                       5                     5                        5                          5  L              5                  5                        5                     5                        5                          5    Sensation - Light touch intact throughout    DTR's - pectoralis and suprapatellar reflexes intact              Biceps      Triceps     Brachioradialis      Patellar    Ankle    Toes/plantar response  R            3+          2+             2+                       3+        1+                 Down  L             3+          2+            2+                        3+        1+                 Down    Coordination - Finger to Nose intact b/l. Heel to kaye intact b/l. No tremors appreciated    Gait and station - Normal casual gait.    Labs:                        12.4   6.56  )-----------( 192      ( 05 Jan 2024 01:37 )             37.2     01-05    141  |  102  |  18  ----------------------------<  121<H>  3.2<L>   |  27  |  0.57    Ca    9.8      05 Jan 2024 01:37  Phos  2.9     01-05  Mg     1.90     01-05    TPro  7.9  /  Alb  4.2  /  TBili  0.2  /  DBili  x   /  AST  45<H>  /  ALT  39<H>  /  AlkPhos  64  01-05    PT/INR - ( 05 Jan 2024 01:37 )   PT: 11.6 sec;   INR: 1.04 ratio       PTT - ( 05 Jan 2024 01:37 )  PTT:32.6 sec  CARDIAC MARKERS ( 05 Jan 2024 01:37 )  x     / x     / 179 U/L / x     / 2.1 ng/mL    Urinalysis Basic - ( 05 Jan 2024 01:37 )  Color: x / Appearance: x / SG: x / pH: x  Gluc: 121 mg/dL / Ketone: x  / Bili: x / Urobili: x   Blood: x / Protein: x / Nitrite: x   Leuk Esterase: x / RBC: x / WBC x   Sq Epi: x / Non Sq Epi: x / Bacteria: x      Radiology:  < from: CT Brain Stroke Protocol (01.05.24 @ 01:43) >  FINDINGS:  There is no CT evidence of acute intracranial hemorrhage, mass effect or   midline shift.  Gray matter-white matterdifferentiation is grossly   preserved.    The ventricles and sulci appear within limits.    The visualized paranasal sinuses and mastoids are clear bilaterally.    The calvarium, skull base, and visualized portions of the orbits appear   within normal limits.    IMPRESSION:  No CT evidence of intracranial pathology.    < end of copied text >

## 2024-01-05 NOTE — CONSULT NOTE ADULT - ATTENDING COMMENTS
She has regained most of her memory of the past day's events except for one short period.  Exam: Memory 3/3 after 5 minutes.     < from: MR Head w/wo IV Cont (01.05.24 @ 13:50) >    Small focus of restricted diffusion within the right hippocampal tail,   compatible with sequela of transient global amnesia given clinical   history.      < end of copied text >    A/P  Ms. Erazo is a 62 yo woman with transient global amnesia.   F/U neurology as outpatient  D/W patient, family and CDU provider.   Thank you

## 2024-01-05 NOTE — ED PROVIDER NOTE - OBJECTIVE STATEMENT
Ms. Erazo is a 63F with h/o HTN who presents BIBEMS for an acute change in her memory. She says that she feels "out of it". Collateral history is provided by her . [place] out in the city with [place] until 8:30 pm. She went to take a shower and after getting out of the shower she was asking him what they did today. She had no memory of the events of the day, which prompted them to come to the hospital.    She says that she wasn't having any abnormal symptoms. She says that she came out of the shower and saw people in her house but didn't know why they were there. She doesn't remember doing anything before the shower. She remembered her .    NOW, she is able to remember all the things that she did today.

## 2024-01-05 NOTE — ED CDU PROVIDER INITIAL DAY NOTE - OBJECTIVE STATEMENT
Ms. Erazo is a 63F with h/o HTN who presents BIBEMS for an acute change in her memory. She says that she feels "out of it". Collateral history is provided by her . She was out in the city with him until 8:30 pm. She went to take a shower and after getting out of the shower she was asking him what they did today. She had no memory of the events of the day, which prompted them to come to the hospital.    She says that she wasn't having any abnormal symptoms. She says that she came out of the shower and saw people in her house but didn't know why they were there. She doesn't remember doing anything before the shower. She remembered her .    NOW, she is able to remember all the things that she did today.    ED HPI as above, noted  Pt is a 62 YO F with PMH UC (on Mesalamine), HTN who presented to ED after episode of amnesia this evening. Pt reports she got out of the shower this evening and could not remember events from earlier in the day. Pt was a code stroke on arrival to ED. Pt without acute abnormality on blood work, CT brain unremarkable. Pt was seen by neurology. Pt placed in CDU for MRI imaging, neuro checks and reassessment.

## 2024-01-05 NOTE — ED PROVIDER NOTE - CLINICAL SUMMARY MEDICAL DECISION MAKING FREE TEXT BOX
63F with h/o HTN p/w transient global amnesia of unclear etiology. Code CVA called. Pt w/o focal neuro deficits and now seems at baseline. DDx also includes non-GTC seizure activity. No infectious or other symptoms.  - Labs  - CT's  - N euro C/S at bedside

## 2024-01-05 NOTE — ED CDU PROVIDER DISPOSITION NOTE - PATIENT PORTAL LINK FT
You can access the FollowMyHealth Patient Portal offered by [facility] by registering at the following website: [email or web address]. By joining [employer]’s FollowMyHealth portal, you will also be able to view your health information using other applications (apps) compatible with our system. You can access the FollowMyHealth Patient Portal offered by [facility] by registering at the following website: [email or web address]. By joining [employer]’s FollowMyHealth portal, you will also be able to view your health information using other applications (apps) compatible with our system.

## 2024-01-05 NOTE — ED CDU PROVIDER INITIAL DAY NOTE - PROGRESS NOTE DETAILS
Patient placed in CDU overnight for MRI of the brain and neurology following.  Patient feels better this morning.  States she "remembers most of yesterday" no numbness no weakness no headache no difficulty speaking or swallowing no chest pain or shortness of breath not dizzy or lightheaded.  Has no metal in her body no pacemaker no defibrillator no prosthesis.  Not claustrophobic.  Patient is pending MRI to be done as well as neurology final recommendations plan discussed with patient and  at bedside BLANCHE Tineo: 53-year-old female with history of hypertension presenting to the emergency department complaining of sudden loss of memory patient did not recall events of the day.  Patient seen evaluated and by neurology patient placed in CDU for MRI.  Patient symptoms resolved patient alert and oriented x 4. Patient seen and cleared by neurology attending Dr. Sim advised discharge home follow-up outpatient.  Patient feels well, ambulating without difficulty, discharge and results reviewed with patient.

## 2024-01-06 LAB
CULTURE RESULTS: SIGNIFICANT CHANGE UP
CULTURE RESULTS: SIGNIFICANT CHANGE UP
SPECIMEN SOURCE: SIGNIFICANT CHANGE UP
SPECIMEN SOURCE: SIGNIFICANT CHANGE UP

## 2024-01-10 LAB
CULTURE RESULTS: SIGNIFICANT CHANGE UP
SPECIMEN SOURCE: SIGNIFICANT CHANGE UP

## 2024-01-16 PROBLEM — I10 ESSENTIAL (PRIMARY) HYPERTENSION: Chronic | Status: ACTIVE | Noted: 2024-01-05

## 2024-05-09 ENCOUNTER — APPOINTMENT (OUTPATIENT)
Dept: NEUROLOGY | Facility: CLINIC | Age: 64
End: 2024-05-09
Payer: MEDICARE

## 2024-05-09 VITALS
HEIGHT: 66 IN | SYSTOLIC BLOOD PRESSURE: 124 MMHG | BODY MASS INDEX: 21.38 KG/M2 | HEART RATE: 66 BPM | DIASTOLIC BLOOD PRESSURE: 80 MMHG | WEIGHT: 133 LBS

## 2024-05-09 DIAGNOSIS — R41.3 OTHER AMNESIA: ICD-10-CM

## 2024-05-09 DIAGNOSIS — Z78.9 OTHER SPECIFIED HEALTH STATUS: ICD-10-CM

## 2024-05-09 DIAGNOSIS — Z86.79 PERSONAL HISTORY OF OTHER DISEASES OF THE CIRCULATORY SYSTEM: ICD-10-CM

## 2024-05-09 PROBLEM — Z00.00 ENCOUNTER FOR PREVENTIVE HEALTH EXAMINATION: Status: ACTIVE | Noted: 2024-05-09

## 2024-05-09 PROCEDURE — 99205 OFFICE O/P NEW HI 60 MIN: CPT

## 2024-05-09 PROCEDURE — G2211 COMPLEX E/M VISIT ADD ON: CPT | Mod: NC

## 2024-05-09 RX ORDER — MESALAMINE 1000 MG/1
SUPPOSITORY RECTAL
Refills: 0 | Status: ACTIVE | COMMUNITY

## 2024-05-09 RX ORDER — AMLODIPINE BESYLATE 5 MG/1
TABLET ORAL
Refills: 0 | Status: ACTIVE | COMMUNITY

## 2024-05-09 RX ORDER — ASCORBIC ACID 500 MG
TABLET ORAL
Refills: 0 | Status: ACTIVE | COMMUNITY

## 2024-05-09 NOTE — HISTORY OF PRESENT ILLNESS
[FreeTextEntry1] : 64 W who is here for initial consultation after a recent hospitalization for global amnesia on 1/5. She states it lasted for hours. HCT, cta and ct perfusion were unremarkable. She had MRI of brain w & wo contrast which showed a small area of restricted diffusion in the right hippocampal tail. This is consistent with her finding of TGA. She also has hx of sleep apnea for which she will start using a cpap machine. Her b1, folate from the [facility] visit was normal. She states on 1/7 she had another episode where she texted someone but she doesn't remember this.

## 2024-05-09 NOTE — PHYSICAL EXAM
[General Appearance - Alert] : alert [Oriented To Time, Place, And Person] : oriented to person, place, and time [Person] : oriented to person [Place] : oriented to place [Time] : oriented to time [Short Term Intact] : short term memory intact [Fluency] : fluency intact [Current Events] : adequate knowledge of current events [Cranial Nerves Optic (II)] : visual acuity intact bilaterally,  visual fields full to confrontation, pupils equal round and reactive to light [Cranial Nerves Oculomotor (III)] : extraocular motion intact [Cranial Nerves Trigeminal (V)] : facial sensation intact symmetrically [Cranial Nerves Facial (VII)] : face symmetrical [Cranial Nerves Vestibulocochlear (VIII)] : hearing was intact bilaterally [Cranial Nerves Accessory (XI - Cranial And Spinal)] : head turning and shoulder shrug symmetric [Cranial Nerves Hypoglossal (XII)] : there was no tongue deviation with protrusion [Motor Tone] : muscle tone was normal in all four extremities [Motor Strength] : muscle strength was normal in all four extremities [Sensation Tactile Decrease] : light touch was intact [Abnormal Walk] : normal gait [Coordination - Dysmetria Impaired Finger-to-Nose Bilateral] : not present [1+] : Patella left 1+

## 2024-05-09 NOTE — DISCUSSION/SUMMARY
[FreeTextEntry1] : 64 W who has TGA with MRI findings. It's unusual that it occurred 2 days after the initial presentation. Will check EEG for any seizure activity. Will reach out to my stroke colleague regarding any further evaluation given the episodes X2.   I spent the time noted on the day of this patient encounter preparing for, review of medical records,review of pertinent diagnostic studies, providing and documenting the above E/M service and counseling and educate patient on differential, workup, disease course, and treatment/management. Education was provided to the patient during this encounter. All questions and concerns were answered and addressed in detail. The patient verbalized understanding and agreed to plan. Patient was advised to continue to monitor for neurologic symptoms and to notify my office or go to the nearest emergency room if there are any changes. Any orders/referrals and communications were provided as well. Side effects of the above medications were discussed in detail including but not limited to applicable black box warning and teratogenicity as appropriate. Patient was advised to bring previous records to my office.

## 2024-05-21 ENCOUNTER — APPOINTMENT (OUTPATIENT)
Dept: NEUROLOGY | Facility: CLINIC | Age: 64
End: 2024-05-21

## 2024-05-30 ENCOUNTER — APPOINTMENT (OUTPATIENT)
Dept: NEUROLOGY | Facility: CLINIC | Age: 64
End: 2024-05-30